# Patient Record
Sex: MALE | Race: WHITE | NOT HISPANIC OR LATINO | URBAN - METROPOLITAN AREA
[De-identification: names, ages, dates, MRNs, and addresses within clinical notes are randomized per-mention and may not be internally consistent; named-entity substitution may affect disease eponyms.]

---

## 2017-12-11 ENCOUNTER — ON CAMPUS - OUTPATIENT (AMBULATORY)
Dept: URBAN - METROPOLITAN AREA HOSPITAL 2 | Facility: HOSPITAL | Age: 62
End: 2017-12-11
Payer: COMMERCIAL

## 2017-12-11 ENCOUNTER — OFFICE (AMBULATORY)
Dept: URBAN - METROPOLITAN AREA CLINIC 64 | Facility: CLINIC | Age: 62
End: 2017-12-11

## 2017-12-11 VITALS
TEMPERATURE: 97.3 F | OXYGEN SATURATION: 96 % | OXYGEN SATURATION: 99 % | SYSTOLIC BLOOD PRESSURE: 121 MMHG | SYSTOLIC BLOOD PRESSURE: 128 MMHG | RESPIRATION RATE: 16 BRPM | RESPIRATION RATE: 18 BRPM | SYSTOLIC BLOOD PRESSURE: 140 MMHG | DIASTOLIC BLOOD PRESSURE: 54 MMHG | HEART RATE: 84 BPM | HEART RATE: 93 BPM | HEART RATE: 87 BPM | SYSTOLIC BLOOD PRESSURE: 153 MMHG | DIASTOLIC BLOOD PRESSURE: 83 MMHG | HEART RATE: 80 BPM | HEIGHT: 74 IN | WEIGHT: 202 LBS | DIASTOLIC BLOOD PRESSURE: 96 MMHG | DIASTOLIC BLOOD PRESSURE: 84 MMHG | DIASTOLIC BLOOD PRESSURE: 98 MMHG | SYSTOLIC BLOOD PRESSURE: 110 MMHG | HEART RATE: 79 BPM | SYSTOLIC BLOOD PRESSURE: 148 MMHG | OXYGEN SATURATION: 97 %

## 2017-12-11 DIAGNOSIS — K64.1 SECOND DEGREE HEMORRHOIDS: ICD-10-CM

## 2017-12-11 DIAGNOSIS — K57.30 DIVERTICULOSIS OF LARGE INTESTINE WITHOUT PERFORATION OR ABS: ICD-10-CM

## 2017-12-11 DIAGNOSIS — D12.0 BENIGN NEOPLASM OF CECUM: ICD-10-CM

## 2017-12-11 DIAGNOSIS — Z12.11 ENCOUNTER FOR SCREENING FOR MALIGNANT NEOPLASM OF COLON: ICD-10-CM

## 2017-12-11 LAB
GI HISTOLOGY: A. UNSPECIFIED: (no result)
GI HISTOLOGY: PDF REPORT: (no result)

## 2017-12-11 PROCEDURE — 88305 TISSUE EXAM BY PATHOLOGIST: CPT | Performed by: INTERNAL MEDICINE

## 2017-12-11 PROCEDURE — 45380 COLONOSCOPY AND BIOPSY: CPT | Mod: 33 | Performed by: INTERNAL MEDICINE

## 2017-12-11 RX ADMIN — PROPOFOL: 10 INJECTION, EMULSION INTRAVENOUS at 11:31

## 2020-07-02 RX ORDER — OMEPRAZOLE 20 MG/1
CAPSULE, DELAYED RELEASE ORAL
Qty: 30 CAPSULE | Refills: 3 | Status: SHIPPED | OUTPATIENT
Start: 2020-07-02 | End: 2021-03-16

## 2020-07-02 RX ORDER — AMLODIPINE BESYLATE 5 MG/1
TABLET ORAL
Qty: 90 TABLET | Refills: 1 | Status: SHIPPED | OUTPATIENT
Start: 2020-07-02 | End: 2021-01-10

## 2020-07-10 RX ORDER — ATORVASTATIN CALCIUM 10 MG/1
TABLET, FILM COATED ORAL
Qty: 90 TABLET | Refills: 1 | Status: SHIPPED | OUTPATIENT
Start: 2020-07-10 | End: 2021-01-26

## 2021-01-10 RX ORDER — AMLODIPINE BESYLATE 5 MG/1
TABLET ORAL
Qty: 90 TABLET | Refills: 1 | Status: SHIPPED | OUTPATIENT
Start: 2021-01-10 | End: 2021-03-08 | Stop reason: SDUPTHER

## 2021-01-26 RX ORDER — ATORVASTATIN CALCIUM 10 MG/1
TABLET, FILM COATED ORAL
Qty: 30 TABLET | Refills: 0 | Status: SHIPPED | OUTPATIENT
Start: 2021-01-26 | End: 2021-03-08 | Stop reason: SDUPTHER

## 2021-03-08 NOTE — TELEPHONE ENCOUNTER
"    Caller: Lauren Yeung    Relationship: Self    Best call back number: [phone]    Medication needed:   Requested Prescriptions     Pending Prescriptions Disp Refills   • atorvastatin (LIPITOR) 10 MG tablet 30 tablet 0     Sig: Take 1 tablet by mouth Daily.   • amLODIPine (NORVASC) 5 MG tablet 90 tablet 1     Sig: Take 1 tablet by mouth Daily.       When do you need the refill by: TODAY     What details did the patient provide when requesting the medication: PT HAS BEEN OUT OF HIS MEDICATION FOR ALMOST A WEEK AND HAD HIS BLOOD PRESSURE TAKEN TODAY @ 170/110    PT STATED HIS HEAD \"FELT FUNNY\" BUT NO OTHER SYMPTOMS. FIRST PHYSICAL IS 5/14, CAN WE FILL AN EMERGENCY SCRIPT UNTIL HIS APPT    Does the patient have less than a 3 day supply:  [x] Yes  [] No    What is the patient's preferred pharmacy: [facility], [place] [address] - [phone] [facility] [phone]           "

## 2021-03-09 ENCOUNTER — TELEPHONE (OUTPATIENT)
Dept: FAMILY MEDICINE CLINIC | Facility: CLINIC | Age: 66
End: 2021-03-09

## 2021-03-09 RX ORDER — ATORVASTATIN CALCIUM 10 MG/1
10 TABLET, FILM COATED ORAL DAILY
Qty: 90 TABLET | Refills: 0 | Status: SHIPPED | OUTPATIENT
Start: 2021-03-09 | End: 2021-03-16 | Stop reason: SDUPTHER

## 2021-03-09 RX ORDER — AMLODIPINE BESYLATE 5 MG/1
5 TABLET ORAL DAILY
Qty: 90 TABLET | Refills: 0 | Status: SHIPPED | OUTPATIENT
Start: 2021-03-09 | End: 2021-03-16 | Stop reason: SDUPTHER

## 2021-03-09 NOTE — TELEPHONE ENCOUNTER
Please tell pt that I refill his meds- but no additional refills until he comes in as past due for appt and labs. The 90 days should last until his may appt

## 2021-03-16 ENCOUNTER — OFFICE VISIT (OUTPATIENT)
Dept: FAMILY MEDICINE CLINIC | Facility: CLINIC | Age: 66
End: 2021-03-16

## 2021-03-16 VITALS
RESPIRATION RATE: 12 BRPM | TEMPERATURE: 97.1 F | SYSTOLIC BLOOD PRESSURE: 136 MMHG | BODY MASS INDEX: 28.72 KG/M2 | HEART RATE: 103 BPM | HEIGHT: 74 IN | OXYGEN SATURATION: 99 % | DIASTOLIC BLOOD PRESSURE: 85 MMHG | WEIGHT: 223.8 LBS

## 2021-03-16 DIAGNOSIS — E78.41 ELEVATED LIPOPROTEIN(A): ICD-10-CM

## 2021-03-16 DIAGNOSIS — I10 ESSENTIAL HYPERTENSION: Primary | ICD-10-CM

## 2021-03-16 PROBLEM — E78.5 HYPERLIPIDEMIA: Status: ACTIVE | Noted: 2018-12-13

## 2021-03-16 PROBLEM — L57.0 ACTINIC KERATOSIS: Status: ACTIVE | Noted: 2017-10-11

## 2021-03-16 PROBLEM — K21.9 GASTROESOPHAGEAL REFLUX DISEASE: Status: ACTIVE | Noted: 2018-12-13

## 2021-03-16 PROBLEM — N40.1 ENLARGED PROSTATE WITH LOWER URINARY TRACT SYMPTOMS (LUTS): Status: ACTIVE | Noted: 2018-12-13

## 2021-03-16 PROCEDURE — 99213 OFFICE O/P EST LOW 20 MIN: CPT | Performed by: INTERNAL MEDICINE

## 2021-03-16 RX ORDER — ATORVASTATIN CALCIUM 10 MG/1
10 TABLET, FILM COATED ORAL DAILY
Qty: 90 TABLET | Refills: 3 | Status: SHIPPED | OUTPATIENT
Start: 2021-03-16 | End: 2022-04-11

## 2021-03-16 RX ORDER — AMLODIPINE BESYLATE 5 MG/1
5 TABLET ORAL DAILY
Qty: 90 TABLET | Refills: 3 | Status: SHIPPED | OUTPATIENT
Start: 2021-03-16 | End: 2022-05-16

## 2021-03-16 NOTE — PROGRESS NOTES
"Rooming Tab(CC,VS,Pt Hx,Fall Screen)  Chief Complaint   Patient presents with   • Hypertension   • Hyperlipidemia       Subjective   Pt here for  HTN and hyperlipidemia.  Received physical at work and was ok- had all labs including EKG-  PSA was good.  Will bring in labs at next time   overall BP doing well- was high that one  Day that didn't take.  No chest pain. No difficulty  breahting   no swelling in ankles. No constipation     some unsteadiness- thinks form bifocals.    some BPH no longer saw palmetto    has GERD-  I have reviewed and updated his medications, medical history and problem list during today's office visit.     Patient Care Team:  Sravani Corrales MD as PCP - General    Problem List Tab  Medications Tab  Synopsis Tab  Chart Review Tab  Care Everywhere Tab  Immunizations Tab  Patient History Tab    Social History     Tobacco Use   • Smoking status: Never Smoker   • Smokeless tobacco: Never Used   Substance Use Topics   • Alcohol use: Never       Review of Systems    Objective     Rooming Tab(CC,VS,Pt Hx,Fall Screen)  /85   Pulse 103   Temp 97.1 °F (36.2 °C)   Resp 12   Ht 188 cm (74\")   Wt 102 kg (223 lb 12.8 oz)   SpO2 99%   BMI 28.73 kg/m²     Body mass index is 28.73 kg/m².    Physical Exam  Vitals and nursing note reviewed.   Constitutional:       Appearance: Normal appearance. He is well-developed.   HENT:      Head: Normocephalic and atraumatic.      Right Ear: Tympanic membrane normal.      Left Ear: Tympanic membrane normal.      Nose: No rhinorrhea.      Mouth/Throat:      Pharynx: No posterior oropharyngeal erythema.   Eyes:      Pupils: Pupils are equal, round, and reactive to light.   Cardiovascular:      Rate and Rhythm: Normal rate and regular rhythm.      Pulses: Normal pulses.      Heart sounds: Normal heart sounds. No murmur heard.     Pulmonary:      Effort: Pulmonary effort is normal.      Breath sounds: Normal breath sounds.   Abdominal:      General: Bowel " sounds are normal. There is no distension.      Palpations: Abdomen is soft.   Musculoskeletal:         General: No tenderness.      Cervical back: Normal range of motion and neck supple.   Skin:     Capillary Refill: Capillary refill takes less than 2 seconds.   Neurological:      Mental Status: He is alert and oriented to person, place, and time.   Psychiatric:         Mood and Affect: Mood normal.         Behavior: Behavior normal.          Statin Choice Calculator  Data Reviewed:         The data below has been reviewed by Sravani Corrales MD on 03/16/2021.          Assessment/Plan   Order Review Tab  Health Maintenance Tab  Patient Plan/Order Tab  Diagnoses and all orders for this visit:    1. Essential hypertension (Primary)  Comments:  refill meds    2. Elevated lipoprotein(a)  Comments:  refill meds- reviewed labs    Other orders  -     amLODIPine (NORVASC) 5 MG tablet; Take 1 tablet by mouth Daily.  Dispense: 90 tablet; Refill: 3  -     atorvastatin (LIPITOR) 10 MG tablet; Take 1 tablet by mouth Daily.  Dispense: 90 tablet; Refill: 3        Wrapup Tab  Return in about 1 year (around 3/16/2022), or if symptoms worsen or fail to improve.       They were informed of the diagnosis and treatment plan and directed to f/u for any further problems or concerns.

## 2021-06-24 ENCOUNTER — OFFICE VISIT (OUTPATIENT)
Dept: FAMILY MEDICINE CLINIC | Facility: CLINIC | Age: 66
End: 2021-06-24

## 2021-06-24 VITALS
HEART RATE: 92 BPM | SYSTOLIC BLOOD PRESSURE: 126 MMHG | RESPIRATION RATE: 16 BRPM | BODY MASS INDEX: 29.24 KG/M2 | OXYGEN SATURATION: 97 % | TEMPERATURE: 97.8 F | HEIGHT: 74 IN | WEIGHT: 227.8 LBS | DIASTOLIC BLOOD PRESSURE: 80 MMHG

## 2021-06-24 DIAGNOSIS — S61.219A CUT OF FINGER: Primary | ICD-10-CM

## 2021-06-24 PROCEDURE — 99212 OFFICE O/P EST SF 10 MIN: CPT | Performed by: HOSPITALIST

## 2021-06-24 NOTE — PROGRESS NOTES
"Subjective   Lauren Yeung is a 66 y.o. male.     Subjective / HPI  Patient seen in follow up after he cut his middle finger, noted nicely healing, no sign of redness or swelling or discharge. Pt going for vacation tomorrow, advise follow up with primary care for annual physical.    Review of Systems    Objective     /80 (BP Location: Right arm, Patient Position: Sitting, Cuff Size: Adult)   Pulse 92   Temp 97.8 °F (36.6 °C) (Oral)   Resp 16   Ht 188 cm (74\")   Wt 103 kg (227 lb 12.8 oz)   SpO2 97%   BMI 29.25 kg/m²      Physical Exam  Finger without any sign of redness or swelling or discharge.  Procedures       Assessment/Plan   Diagnoses and all orders for this visit:    1. Cut of finger (Primary)  Comments:  noted healing nicely. pt advise to avoid water contact. follow up as needed.                "

## 2021-11-02 ENCOUNTER — OFFICE VISIT (OUTPATIENT)
Dept: FAMILY MEDICINE CLINIC | Facility: CLINIC | Age: 66
End: 2021-11-02

## 2021-11-02 ENCOUNTER — LAB (OUTPATIENT)
Dept: FAMILY MEDICINE CLINIC | Facility: CLINIC | Age: 66
End: 2021-11-02

## 2021-11-02 VITALS
BODY MASS INDEX: 29.24 KG/M2 | RESPIRATION RATE: 18 BRPM | OXYGEN SATURATION: 98 % | DIASTOLIC BLOOD PRESSURE: 72 MMHG | WEIGHT: 227.8 LBS | HEIGHT: 74 IN | SYSTOLIC BLOOD PRESSURE: 112 MMHG | TEMPERATURE: 97.8 F | HEART RATE: 94 BPM

## 2021-11-02 DIAGNOSIS — Z00.00 PREVENTATIVE HEALTH CARE: ICD-10-CM

## 2021-11-02 DIAGNOSIS — E78.41 ELEVATED LIPOPROTEIN(A): ICD-10-CM

## 2021-11-02 DIAGNOSIS — Z23 NEED FOR VACCINATION: Primary | ICD-10-CM

## 2021-11-02 DIAGNOSIS — N28.9 RENAL INSUFFICIENCY: ICD-10-CM

## 2021-11-02 DIAGNOSIS — N28.9 RENAL INSUFFICIENCY: Primary | ICD-10-CM

## 2021-11-02 DIAGNOSIS — I10 PRIMARY HYPERTENSION: ICD-10-CM

## 2021-11-02 DIAGNOSIS — K21.9 GASTROESOPHAGEAL REFLUX DISEASE WITHOUT ESOPHAGITIS: ICD-10-CM

## 2021-11-02 PROCEDURE — 90471 IMMUNIZATION ADMIN: CPT | Performed by: INTERNAL MEDICINE

## 2021-11-02 PROCEDURE — 90715 TDAP VACCINE 7 YRS/> IM: CPT | Performed by: INTERNAL MEDICINE

## 2021-11-02 PROCEDURE — 36415 COLL VENOUS BLD VENIPUNCTURE: CPT

## 2021-11-02 PROCEDURE — 80053 COMPREHEN METABOLIC PANEL: CPT | Performed by: INTERNAL MEDICINE

## 2021-11-02 PROCEDURE — 99397 PER PM REEVAL EST PAT 65+ YR: CPT | Performed by: INTERNAL MEDICINE

## 2021-11-02 RX ORDER — FAMOTIDINE 40 MG/1
40 TABLET, FILM COATED ORAL DAILY
Qty: 30 TABLET | Refills: 3 | Status: SHIPPED | OUTPATIENT
Start: 2021-11-02 | End: 2022-03-29

## 2021-11-02 NOTE — ASSESSMENT & PLAN NOTE
Renal condition is newly identified.  Continue current treatment regimen.  Renal condition will be reassessed in 3 months.   increase water consumption

## 2021-11-02 NOTE — PROGRESS NOTES
"Rooming Tab(CC,VS,Pt Hx,Fall Screen)  Chief Complaint   Patient presents with   • Annual Exam       Subjective   Pt here for annual exam- did blood work at work and found renal was up- unsure if was elevated  Before but doesn't think so.   reviewed all other labs- including PSA   had COVID vaccine  In feb/march but had COVID in 8/2021- had 2 bad days only- has taste and smell, no SOA headache resolved.  increased GERD taking tums everyday   no new moles,     no joint pain, mild back pain, no neuropathy.   hearing good  Using commercial insurance so not doing medicare wellness but phydical instead.    increased urination    I have reviewed and updated his medications, medical history and problem list during today's office visit.     Patient Care Team:  Sravani Corrales MD as PCP - General    Problem List Tab  Medications Tab  Synopsis Tab  Chart Review Tab  Care Everywhere Tab  Immunizations Tab  Patient History Tab    Social History     Tobacco Use   • Smoking status: Never Smoker   • Smokeless tobacco: Never Used   Substance Use Topics   • Alcohol use: Never       Review of Systems    Objective     Rooming Tab(CC,VS,Pt Hx,Fall Screen)  /72 (BP Location: Right arm, Patient Position: Sitting, Cuff Size: Adult)   Pulse 94   Temp 97.8 °F (36.6 °C) (Temporal)   Resp 18   Ht 188 cm (74\")   Wt 103 kg (227 lb 12.8 oz)   SpO2 98%   BMI 29.25 kg/m²     Body mass index is 29.25 kg/m².    Physical Exam  Vitals and nursing note reviewed.   Constitutional:       Appearance: Normal appearance. He is well-developed.   HENT:      Head: Normocephalic and atraumatic.      Right Ear: Tympanic membrane normal.      Left Ear: Tympanic membrane normal.      Nose: No rhinorrhea.      Mouth/Throat:      Pharynx: No posterior oropharyngeal erythema.   Eyes:      Pupils: Pupils are equal, round, and reactive to light.   Cardiovascular:      Rate and Rhythm: Normal rate and regular rhythm.      Pulses: Normal pulses.      " Heart sounds: Normal heart sounds. No murmur heard.      Pulmonary:      Effort: Pulmonary effort is normal.      Breath sounds: Normal breath sounds.   Abdominal:      General: Bowel sounds are normal. There is no distension.      Palpations: Abdomen is soft.   Musculoskeletal:         General: No tenderness.      Cervical back: Normal range of motion and neck supple.   Skin:     Capillary Refill: Capillary refill takes less than 2 seconds.   Neurological:      Mental Status: He is alert and oriented to person, place, and time.   Psychiatric:         Mood and Affect: Mood normal.         Behavior: Behavior normal.          Statin Choice Calculator  Data Reviewed:         The data below has been reviewed by Sravani Corrales MD on 11/02/2021.          Assessment/Plan   Order Review Tab  Health Maintenance Tab  Patient Plan/Order Tab  Diagnoses and all orders for this visit:    1. Need for vaccination (Primary)  -     Tdap Vaccine Greater Than or Equal To 8yo IM    2. Gastroesophageal reflux disease without esophagitis    3. Primary hypertension    4. Elevated lipoprotein(a)    5. Preventative health care  Assessment & Plan:  Discussed all recommendations       6. Renal insufficiency  Assessment & Plan:  Renal condition is newly identified.  Continue current treatment regimen.  Renal condition will be reassessed in 3 months.   increase water consumption      Other orders  -     famotidine (Pepcid) 40 MG tablet; Take 1 tablet by mouth Daily.  Dispense: 30 tablet; Refill: 3      Wrapup Tab  Return in about 3 months (around 2/2/2022), or if symptoms worsen or fail to improve, for Recheck.        During this visit for their annual exam, we reviewed their personal history, social history and family history.  We went over their medications and all the recommended health maintenence items for their age group. They were given the opportunity to ask questions and discuss other concerns.       will check renal today- if >  1.45 will check grisel Us advised ot drink more water

## 2021-11-03 LAB
ALBUMIN SERPL-MCNC: 4.2 G/DL (ref 3.5–5.2)
ALBUMIN/GLOB SERPL: 1.6 G/DL
ALP SERPL-CCNC: 67 U/L (ref 39–117)
ALT SERPL W P-5'-P-CCNC: 25 U/L (ref 1–41)
ANION GAP SERPL CALCULATED.3IONS-SCNC: 6.5 MMOL/L (ref 5–15)
AST SERPL-CCNC: 21 U/L (ref 1–40)
BILIRUB SERPL-MCNC: 0.4 MG/DL (ref 0–1.2)
BUN SERPL-MCNC: 24 MG/DL (ref 8–23)
BUN/CREAT SERPL: 18 (ref 7–25)
CALCIUM SPEC-SCNC: 9.2 MG/DL (ref 8.6–10.5)
CHLORIDE SERPL-SCNC: 106 MMOL/L (ref 98–107)
CO2 SERPL-SCNC: 27.5 MMOL/L (ref 22–29)
CREAT SERPL-MCNC: 1.33 MG/DL (ref 0.76–1.27)
GFR SERPL CREATININE-BSD FRML MDRD: 54 ML/MIN/1.73
GLOBULIN UR ELPH-MCNC: 2.7 GM/DL
GLUCOSE SERPL-MCNC: 120 MG/DL (ref 65–99)
POTASSIUM SERPL-SCNC: 4.1 MMOL/L (ref 3.5–5.2)
PROT SERPL-MCNC: 6.9 G/DL (ref 6–8.5)
SODIUM SERPL-SCNC: 140 MMOL/L (ref 136–145)

## 2022-03-29 RX ORDER — FAMOTIDINE 40 MG/1
TABLET, FILM COATED ORAL
Qty: 30 TABLET | Refills: 3 | Status: SHIPPED | OUTPATIENT
Start: 2022-03-29 | End: 2022-09-26

## 2022-04-11 RX ORDER — ATORVASTATIN CALCIUM 10 MG/1
TABLET, FILM COATED ORAL
Qty: 90 TABLET | Refills: 3 | Status: SHIPPED | OUTPATIENT
Start: 2022-04-11

## 2022-05-16 RX ORDER — AMLODIPINE BESYLATE 5 MG/1
TABLET ORAL
Qty: 90 TABLET | Refills: 1 | Status: SHIPPED | OUTPATIENT
Start: 2022-05-16 | End: 2022-11-28

## 2022-05-31 ENCOUNTER — TELEPHONE (OUTPATIENT)
Dept: FAMILY MEDICINE CLINIC | Facility: CLINIC | Age: 67
End: 2022-05-31

## 2022-05-31 NOTE — TELEPHONE ENCOUNTER
Caller: Lauren Yeung    Relationship to patient: Self    Best call back number: [phone]    Patient is needing: PATIENT CALLED AND SAID HE   STUCK HIMSELF WITH A NAIL YESTERDAY AND IS WANTING TO SEE WHEN HIS LAST TETANUS SHOT WAS WITH THE OFFICE

## 2022-09-26 RX ORDER — FAMOTIDINE 40 MG/1
TABLET, FILM COATED ORAL
Qty: 90 TABLET | Refills: 1 | Status: SHIPPED | OUTPATIENT
Start: 2022-09-26

## 2022-11-28 RX ORDER — AMLODIPINE BESYLATE 5 MG/1
TABLET ORAL
Qty: 90 TABLET | Refills: 1 | Status: SHIPPED | OUTPATIENT
Start: 2022-11-28

## 2023-03-24 ENCOUNTER — OFFICE VISIT (OUTPATIENT)
Dept: FAMILY MEDICINE CLINIC | Facility: CLINIC | Age: 68
End: 2023-03-24
Payer: COMMERCIAL

## 2023-03-24 VITALS
OXYGEN SATURATION: 98 % | DIASTOLIC BLOOD PRESSURE: 84 MMHG | SYSTOLIC BLOOD PRESSURE: 120 MMHG | HEART RATE: 80 BPM | BODY MASS INDEX: 27.85 KG/M2 | WEIGHT: 217 LBS | HEIGHT: 74 IN

## 2023-03-24 DIAGNOSIS — E86.0 DEHYDRATION: ICD-10-CM

## 2023-03-24 DIAGNOSIS — I10 PRIMARY HYPERTENSION: Primary | ICD-10-CM

## 2023-03-24 DIAGNOSIS — R40.4 TRANSIENT ALTERATION OF AWARENESS: ICD-10-CM

## 2023-03-24 PROCEDURE — 99214 OFFICE O/P EST MOD 30 MIN: CPT | Performed by: INTERNAL MEDICINE

## 2023-03-24 RX ORDER — ASPIRIN 81 MG/1
81 TABLET ORAL DAILY
COMMUNITY

## 2023-03-24 NOTE — PROGRESS NOTES
Rooming Tab(CC,VS,Pt Hx,Fall Screen)  Chief Complaint   Patient presents with   • Hospital Follow Up Visit     Seen at [facility] ED       Subjective      The patient presents today for a follow-up. He is accompanied by an adult female.    Slurred speech  The patient did not have enough to eat and drink the whole weekend. He tries not to eat anything after 9:00 PM because of the reflux. He did not drink enough water. He ate a bland diet. He started seeing the floaters and there are usually thread light floaters that is normal. They look like square spider webs.  He was standing and talking and all of a sudden, he could not understand what he was saying. He was driven to the medical department and by the time he got up there, it had stopped. He felt woozy, but all that stopped. He went in and took his blood glucose. He had a banana and orange. He did not have any issues swallowing. He did feel lightheaded, but he was fine. The nurse practitioner told him that he was going to the emergency room and his blood pressure was high at 157/90s mmHg.     By the time he got to the emergency room, it went down, but it was still 138/92 mmHg. He was not having slurred speech or dizziness anymore. He never had 200 mmHg systolic blood pressure. He laid on the gurney for 30 minutes before they finally got a room for him. He felt totally fine.    They did a chest radiograph, a CAT scan of his head, a lot of blood work, and an EKG. They did an MRI. He had to go to the bathroom and his urine was darker than it normally is. He thinks he was dehydrated and undernourished. He did not get the stomach bug. He does not remember if they wanted to do a stress test at some point. He had blood glucose of 62 mg/dL. He does not check his blood pressure at home. He has been taking his blood pressure medication every day. He does not feel like he has to hold onto the walls because he gets lightheaded. He does not see any floaters.      Sinus  "problems  The patient has had sinus problems for 60 years and they are bothering him right now. It is just draining all the time and it can be green or yellow. He uses a nasal rinse occasionally, but it does not improve his symptoms when he does it. He uses Flonase when allergy season starts. He has had allergy injections up until 2005. He was extremely allergic to Hickory and maple. He was given an EpiPen and Benadryl.      I have reviewed and updated his medications, medical history and problem list during today's office visit.     Patient Care Team:  Sravani Corrales MD as PCP - General    Problem List Tab  Medications Tab  Synopsis Tab  Chart Review Tab  Care Everywhere Tab  Immunizations Tab  Patient History Tab    Social History     Tobacco Use   • Smoking status: Never   • Smokeless tobacco: Never   Substance Use Topics   • Alcohol use: Never       Review of Systems    Objective     Rooming Tab(CC,VS,Pt Hx,Fall Screen)  /84   Pulse 80   Ht 188 cm (74\")   Wt 98.4 kg (217 lb)   SpO2 98%   BMI 27.86 kg/m²     Body mass index is 27.86 kg/m².    Physical Exam  Vitals and nursing note reviewed.   Constitutional:       Appearance: Normal appearance. He is well-developed.   HENT:      Head: Normocephalic and atraumatic.      Right Ear: Tympanic membrane normal.      Left Ear: Tympanic membrane normal.      Nose: No rhinorrhea.      Mouth/Throat:      Pharynx: No posterior oropharyngeal erythema.   Eyes:      Pupils: Pupils are equal, round, and reactive to light.   Cardiovascular:      Rate and Rhythm: Normal rate and regular rhythm.      Pulses: Normal pulses.      Heart sounds: Normal heart sounds. No murmur heard.  Pulmonary:      Effort: Pulmonary effort is normal.      Breath sounds: Normal breath sounds.   Musculoskeletal:         General: No tenderness.      Cervical back: Normal range of motion and neck supple.   Skin:     Capillary Refill: Capillary refill takes less than 2 seconds. "   Neurological:      Mental Status: He is alert and oriented to person, place, and time.   Psychiatric:         Mood and Affect: Mood normal.         Behavior: Behavior normal.          Statin Choice Calculator  Data Reviewed:         The data below has been reviewed by Sravani Corrales MD on 03/24/2023.          Assessment & Plan   Order Review Tab  Health Maintenance Tab  Patient Plan/Order Tab  Diagnoses and all orders for this visit:    1. Primary hypertension (Primary)    2. Transient alteration of awareness  Comments:  reviewed all labs and MRI/EKG. no medical reason for slurred speech except low sugar- advised better eating and no skipping meals     3. Dehydration         1. Hypertension  - I advised the patient to check his blood pressure once a week.  - I advised the patient to eat 3 small meals, but at least 3 meals that have protein in every meal.    2. Sinus infection  - I advised the patient to make an appointment with Dr. Sweeney.      Wrapup Tab  Return in about 3 months (around 6/24/2023), or if symptoms worsen or fail to improve, for Annual physical.       They were informed of the diagnosis and treatment plan and directed to f/u for any further problems or concerns.                Answers for HPI/ROS submitted by the patient on 3/22/2023  What is the primary reason for your visit?: Neurological Problem  altered mental status: Yes  clumsiness: No  focal sensory loss: No  focal weakness: No  loss of balance: No  memory loss: No  near-syncope: No  slurred speech: Yes  visual change: Yes  Onset: in the past 7 days  Onset quality: suddenly  Progression since onset: resolved  Focality: no focality noted  auditory change: No  aura: No  bowel incontinence: No  headaches: No  vertigo: No  Treatments tried: aspirin, bed rest, drinking, eating  Improvement on treatment: significant      Transcribed from ambient dictation for Sravani Corrales MD by Janis Hernandez.  03/24/23   18:01 EDT    Patient or  patient representative verbalized consent to the visit recording.  I have personally performed the services described in this document as transcribed by the above individual, and it is both accurate and complete.  Sravani Corrales MD  3/29/2023  21:09 EDT

## 2023-03-29 PROBLEM — R40.4 TRANSIENT ALTERATION OF AWARENESS: Status: ACTIVE | Noted: 2023-03-29

## 2023-03-29 PROBLEM — E86.0 DEHYDRATION: Status: ACTIVE | Noted: 2023-03-29

## 2023-04-23 RX ORDER — ATORVASTATIN CALCIUM 10 MG/1
TABLET, FILM COATED ORAL
Qty: 90 TABLET | Refills: 3 | Status: SHIPPED | OUTPATIENT
Start: 2023-04-23

## 2023-05-30 RX ORDER — AMLODIPINE BESYLATE 5 MG/1
TABLET ORAL
Qty: 90 TABLET | Refills: 1 | Status: SHIPPED | OUTPATIENT
Start: 2023-05-30

## 2023-12-14 ENCOUNTER — OFFICE VISIT (OUTPATIENT)
Dept: FAMILY MEDICINE CLINIC | Facility: CLINIC | Age: 68
End: 2023-12-14
Payer: COMMERCIAL

## 2023-12-14 VITALS
BODY MASS INDEX: 28.77 KG/M2 | OXYGEN SATURATION: 99 % | HEART RATE: 99 BPM | HEIGHT: 74 IN | RESPIRATION RATE: 16 BRPM | WEIGHT: 224.2 LBS | DIASTOLIC BLOOD PRESSURE: 86 MMHG | SYSTOLIC BLOOD PRESSURE: 140 MMHG

## 2023-12-14 DIAGNOSIS — E78.41 ELEVATED LIPOPROTEIN(A): ICD-10-CM

## 2023-12-14 DIAGNOSIS — R35.1 BENIGN PROSTATIC HYPERPLASIA WITH NOCTURIA: ICD-10-CM

## 2023-12-14 DIAGNOSIS — Z00.00 ENCOUNTER FOR PHYSICAL EXAMINATION: Primary | ICD-10-CM

## 2023-12-14 DIAGNOSIS — I10 PRIMARY HYPERTENSION: ICD-10-CM

## 2023-12-14 DIAGNOSIS — N40.1 BENIGN PROSTATIC HYPERPLASIA WITH NOCTURIA: ICD-10-CM

## 2023-12-14 RX ORDER — AMLODIPINE BESYLATE 5 MG/1
5 TABLET ORAL DAILY
Qty: 90 TABLET | Refills: 3 | Status: SHIPPED | OUTPATIENT
Start: 2023-12-14

## 2023-12-14 RX ORDER — TAMSULOSIN HYDROCHLORIDE 0.4 MG/1
1 CAPSULE ORAL DAILY
Qty: 90 CAPSULE | Refills: 3 | Status: SHIPPED | OUTPATIENT
Start: 2023-12-14

## 2023-12-14 NOTE — PROGRESS NOTES
"Chief Complaint  Establish Care (Med refills /Left arm elbow pain)    Subjective        Lauren Yeung presents to [facility]  History of Present Illness  Lauren is a 68-year-old male with history of GERD, hyperlipidemia presents today to establish care with me.  Overall he states he is feeling pretty well.  He works for [employer] company and is .  He has a 15-year-old daughter that keeps him pretty busy.  Reviewed his past medical history, social, family, surgical    Hypertension  Currently states he has never been on medication for this.  States it has always been somewhat high.  Denies any chest pain, vision changes, syncope, headaches.    GERD  Currently well-controlled with the Pepcid 40 mg daily.  States that that controls his symptoms pretty well.  He takes it more as needed as needed for it.  States that that controls his symptoms well.    He does state that he does wake up multiple times at night to urinate.  States that his stream is not as strong as it used to be.  Does have difficulties obtaining history.      Objective   Vital Signs:  /86 (BP Location: Left arm, Patient Position: Sitting, Cuff Size: Large Adult)   Pulse 99   Resp 16   Ht 188 cm (74\")   Wt 102 kg (224 lb 3.2 oz)   SpO2 99%   BMI 28.79 kg/m²   Estimated body mass index is 28.79 kg/m² as calculated from the following:    Height as of this encounter: 188 cm (74\").    Weight as of this encounter: 102 kg (224 lb 3.2 oz).       BMI is >= 25 and <30. (Overweight) The following options were offered after discussion;: exercise counseling/recommendations and nutrition counseling/recommendations      Physical Exam  Vitals and nursing note reviewed.   Constitutional:       General: He is not in acute distress.     Appearance: Normal appearance. He is not toxic-appearing.   HENT:      Head: Normocephalic and atraumatic.      Right Ear: Tympanic membrane, ear canal and " external ear normal.      Left Ear: Tympanic membrane, ear canal and external ear normal.      Nose: Nose normal. No congestion or rhinorrhea.      Mouth/Throat:      Mouth: Mucous membranes are moist.      Pharynx: No oropharyngeal exudate.   Eyes:      General: No scleral icterus.        Right eye: No discharge.         Left eye: No discharge.      Extraocular Movements: Extraocular movements intact.      Conjunctiva/sclera: Conjunctivae normal.      Pupils: Pupils are equal, round, and reactive to light.   Cardiovascular:      Rate and Rhythm: Normal rate and regular rhythm.      Pulses: Normal pulses.      Heart sounds: Normal heart sounds. No murmur heard.  Pulmonary:      Effort: Pulmonary effort is normal. No respiratory distress.      Breath sounds: Normal breath sounds. No wheezing.   Abdominal:      General: Abdomen is flat. Bowel sounds are normal. There is no distension.      Palpations: Abdomen is soft.      Tenderness: There is no abdominal tenderness. There is no guarding.   Musculoskeletal:         General: No swelling, tenderness or deformity. Normal range of motion.      Cervical back: Normal range of motion and neck supple. No rigidity or tenderness.   Lymphadenopathy:      Cervical: No cervical adenopathy.   Skin:     General: Skin is warm.      Capillary Refill: Capillary refill takes less than 2 seconds.   Neurological:      General: No focal deficit present.      Mental Status: He is alert and oriented to person, place, and time. Mental status is at baseline.      Cranial Nerves: No cranial nerve deficit.      Gait: Gait normal.   Psychiatric:         Mood and Affect: Mood normal.         Behavior: Behavior normal.         Thought Content: Thought content normal.         Judgment: Judgment normal.        Result Review :  The following data was reviewed by: Richard Sarmiento MD on 12/14/2023:    Data reviewed : Previous labs             Assessment and Plan   Diagnoses and all orders for this  visit:    1. Encounter for physical examination (Primary)    2. Elevated lipoprotein(a)    3. Benign prostatic hyperplasia with nocturia    4. Primary hypertension    Other orders  -     amLODIPine (NORVASC) 5 MG tablet; Take 1 tablet by mouth Daily.  Dispense: 90 tablet; Refill: 3  -     tamsulosin (FLOMAX) 0.4 MG capsule 24 hr capsule; Take 1 capsule by mouth Daily.  Dispense: 90 capsule; Refill: 3  -     Shingrix Vaccine    Hypertension  Currently not well-controlled.  However he has been out of his blood pressure medications now for over a week.  Will restart and he should get me those records of his blood pressure over the next couple weeks.    GERD  Continue Pepcid as needed for pain.  No further indication for workup.  Currently well-controlled.    BPH  Has some nocturia and weak stream.  Start tamsulosin and see if how that helps.  Will see how he does with that.  If better we can continue with no change we can stop.  This will also help with his blood pressure as well.    Hyperlipidemia  Currently well-controlled.  Continue atorvastatin 10 mg.  Will due for labs at his next visit.         Follow Up   Return in about 1 year (around 12/14/2024) for Annual physical.  Patient was given instructions and counseling regarding his condition or for health maintenance advice. Please see specific information pulled into the AVS if appropriate.

## 2023-12-18 PROBLEM — Z00.00 PREVENTATIVE HEALTH CARE: Status: RESOLVED | Noted: 2021-11-02 | Resolved: 2023-12-18

## 2023-12-18 PROBLEM — R40.4 TRANSIENT ALTERATION OF AWARENESS: Status: RESOLVED | Noted: 2023-03-29 | Resolved: 2023-12-18

## 2024-02-15 DIAGNOSIS — Z12.11 SCREENING FOR MALIGNANT NEOPLASM OF COLON: Primary | ICD-10-CM

## 2024-04-14 RX ORDER — ATORVASTATIN CALCIUM 10 MG/1
TABLET, FILM COATED ORAL
Qty: 90 TABLET | Refills: 0 | Status: SHIPPED | OUTPATIENT
Start: 2024-04-14

## 2024-04-22 ENCOUNTER — ON CAMPUS - OUTPATIENT (AMBULATORY)
Dept: URBAN - METROPOLITAN AREA HOSPITAL 2 | Facility: HOSPITAL | Age: 69
End: 2024-04-22
Payer: COMMERCIAL

## 2024-04-22 ENCOUNTER — OFFICE (AMBULATORY)
Dept: URBAN - METROPOLITAN AREA PATHOLOGY 19 | Facility: PATHOLOGY | Age: 69
End: 2024-04-22
Payer: COMMERCIAL

## 2024-04-22 VITALS
DIASTOLIC BLOOD PRESSURE: 69 MMHG | RESPIRATION RATE: 15 BRPM | OXYGEN SATURATION: 96 % | RESPIRATION RATE: 20 BRPM | HEIGHT: 74 IN | HEART RATE: 80 BPM | DIASTOLIC BLOOD PRESSURE: 83 MMHG | SYSTOLIC BLOOD PRESSURE: 118 MMHG | RESPIRATION RATE: 16 BRPM | DIASTOLIC BLOOD PRESSURE: 78 MMHG | SYSTOLIC BLOOD PRESSURE: 97 MMHG | OXYGEN SATURATION: 97 % | WEIGHT: 220 LBS | OXYGEN SATURATION: 98 % | SYSTOLIC BLOOD PRESSURE: 148 MMHG | OXYGEN SATURATION: 95 % | SYSTOLIC BLOOD PRESSURE: 102 MMHG | DIASTOLIC BLOOD PRESSURE: 67 MMHG | SYSTOLIC BLOOD PRESSURE: 93 MMHG | DIASTOLIC BLOOD PRESSURE: 71 MMHG | HEART RATE: 86 BPM | TEMPERATURE: 98.1 F | RESPIRATION RATE: 18 BRPM | HEART RATE: 81 BPM | HEART RATE: 82 BPM | OXYGEN SATURATION: 94 % | SYSTOLIC BLOOD PRESSURE: 107 MMHG | RESPIRATION RATE: 17 BRPM | SYSTOLIC BLOOD PRESSURE: 109 MMHG | HEART RATE: 83 BPM | HEART RATE: 77 BPM | DIASTOLIC BLOOD PRESSURE: 95 MMHG | SYSTOLIC BLOOD PRESSURE: 117 MMHG

## 2024-04-22 DIAGNOSIS — Z09 ENCOUNTER FOR FOLLOW-UP EXAMINATION AFTER COMPLETED TREATMEN: ICD-10-CM

## 2024-04-22 DIAGNOSIS — D12.4 BENIGN NEOPLASM OF DESCENDING COLON: ICD-10-CM

## 2024-04-22 DIAGNOSIS — Z86.010 PERSONAL HISTORY OF COLONIC POLYPS: ICD-10-CM

## 2024-04-22 DIAGNOSIS — K57.30 DIVERTICULOSIS OF LARGE INTESTINE WITHOUT PERFORATION OR ABS: ICD-10-CM

## 2024-04-22 DIAGNOSIS — K64.1 SECOND DEGREE HEMORRHOIDS: ICD-10-CM

## 2024-04-22 PROBLEM — K63.5 POLYP OF COLON: Status: ACTIVE | Noted: 2024-04-22

## 2024-04-22 LAB
GI HISTOLOGY: A. DESCENDING COLON: (no result)
GI HISTOLOGY: PDF REPORT: (no result)

## 2024-04-22 PROCEDURE — 45385 COLONOSCOPY W/LESION REMOVAL: CPT | Mod: 33 | Performed by: INTERNAL MEDICINE

## 2024-04-22 PROCEDURE — 88305 TISSUE EXAM BY PATHOLOGIST: CPT | Performed by: PATHOLOGY

## 2024-04-29 ENCOUNTER — TELEPHONE (OUTPATIENT)
Dept: FAMILY MEDICINE CLINIC | Facility: CLINIC | Age: 69
End: 2024-04-29
Payer: COMMERCIAL

## 2024-04-29 NOTE — TELEPHONE ENCOUNTER
Pt called back and results/message given to pt and he v/u. States that Dr. Hernandez's office already called him and told him that his pathology results were negative.     Tried calling pt to give result/message and had to LVM to call back.     ----- Message from Kacy KIMBALL sent at 4/29/2024  7:00 AM EDT -----  Please let the patient know that his colonoscopy showed a couple things. One, polyps removed and will wait for pathology to determine what the next best step for those.  Next, he had diverticulosis which is an outpouching of the intestines, would make sure that he eats a good balanced diet and increase his fiber intake.  Would also suggest taking a stool softener daily.  He also had some internal and external hemorrhoids.  He will need repeat colonoscopy in 3 years.  Thanks

## 2024-07-16 RX ORDER — ATORVASTATIN CALCIUM 10 MG/1
10 TABLET, FILM COATED ORAL DAILY
Qty: 90 TABLET | Refills: 0 | Status: SHIPPED | OUTPATIENT
Start: 2024-07-16

## 2024-10-11 RX ORDER — ATORVASTATIN CALCIUM 10 MG/1
10 TABLET, FILM COATED ORAL DAILY
Qty: 90 TABLET | Refills: 0 | Status: SHIPPED | OUTPATIENT
Start: 2024-10-11

## 2024-12-06 RX ORDER — TAMSULOSIN HYDROCHLORIDE 0.4 MG/1
1 CAPSULE ORAL DAILY
Qty: 90 CAPSULE | Refills: 3 | Status: SHIPPED | OUTPATIENT
Start: 2024-12-06

## 2024-12-06 RX ORDER — AMLODIPINE BESYLATE 5 MG/1
5 TABLET ORAL DAILY
Qty: 90 TABLET | Refills: 3 | Status: SHIPPED | OUTPATIENT
Start: 2024-12-06

## 2025-01-15 RX ORDER — ATORVASTATIN CALCIUM 10 MG/1
10 TABLET, FILM COATED ORAL DAILY
Qty: 90 TABLET | Refills: 0 | Status: SHIPPED | OUTPATIENT
Start: 2025-01-15

## 2025-02-18 ENCOUNTER — OFFICE VISIT (OUTPATIENT)
Dept: FAMILY MEDICINE CLINIC | Facility: CLINIC | Age: 70
End: 2025-02-18
Payer: COMMERCIAL

## 2025-02-18 VITALS
RESPIRATION RATE: 18 BRPM | OXYGEN SATURATION: 97 % | WEIGHT: 227.4 LBS | HEART RATE: 92 BPM | SYSTOLIC BLOOD PRESSURE: 142 MMHG | HEIGHT: 74 IN | DIASTOLIC BLOOD PRESSURE: 88 MMHG | BODY MASS INDEX: 29.18 KG/M2

## 2025-02-18 DIAGNOSIS — I10 PRIMARY HYPERTENSION: ICD-10-CM

## 2025-02-18 DIAGNOSIS — K21.9 GASTROESOPHAGEAL REFLUX DISEASE WITHOUT ESOPHAGITIS: ICD-10-CM

## 2025-02-18 DIAGNOSIS — E78.41 ELEVATED LIPOPROTEIN(A): ICD-10-CM

## 2025-02-18 DIAGNOSIS — R35.1 BENIGN PROSTATIC HYPERPLASIA WITH NOCTURIA: ICD-10-CM

## 2025-02-18 DIAGNOSIS — N40.1 BENIGN PROSTATIC HYPERPLASIA WITH NOCTURIA: ICD-10-CM

## 2025-02-18 DIAGNOSIS — Z00.00 PREVENTATIVE HEALTH CARE: Primary | ICD-10-CM

## 2025-02-18 PROBLEM — E86.0 DEHYDRATION: Status: RESOLVED | Noted: 2023-03-29 | Resolved: 2025-02-18

## 2025-02-18 PROBLEM — N28.9 RENAL INSUFFICIENCY: Status: RESOLVED | Noted: 2021-11-02 | Resolved: 2025-02-18

## 2025-02-18 PROCEDURE — 99214 OFFICE O/P EST MOD 30 MIN: CPT | Performed by: INTERNAL MEDICINE

## 2025-02-18 PROCEDURE — 99397 PER PM REEVAL EST PAT 65+ YR: CPT | Performed by: INTERNAL MEDICINE

## 2025-02-18 NOTE — PATIENT INSTRUCTIONS
Up to date with labs    Up to date vaccine    Medications:  Continue current medications as prescribed    Encourage healthy diet and exercise    Follow up in October for Medicare wellness visit

## 2025-02-18 NOTE — PROGRESS NOTES
Chief Complaint  Establish Care    HPI:    Lauren Yeung presents to [facility]    Patient is a 69-year-old male presenting to establish care with new PCP.  Previously followed with Dr. Sarmiento.  Most recent annual preventative care visit 12/14/2023.    Hypertension  Currently prescribed amlodipine 5 mg daily.  Recent home blood pressures typically better controlled than office reading. Denies side effects on current anti-hypertensive medication regimen. Denies headaches, blurry vision, dizziness, chest pain, shortness of breath, or palpitations.  Diet and exercise could be better.     Hyperlipidemia  Patient currently prescribed atorvastatin 10 mg daily.  Compliant with medication and tolerating well without significant side effects.  No side effects.     GERD  Previously on Pepsid. Symptoms well controlled with not eating lat at nightly.  Denies dysphagia, dyne aphasia, or unexplained weight loss.  No current alcohol and tobacco use.     Chronic sinus drainage  Currently following with allergy and getting shots. Reportedly allergic to trees and pollen. Currently on Flonase twice daily.     BPH  Currently taking tamsulosin 0.4 mg daily.  Currently using the bathroom approximately 2-3 times nightly. Denies dysuria, urgency, frequency, hematuria, cloudy/foul smelling urine, or flank pain.     Preventative:    Social:     Diet and Exercise: Could be better    Alcohol, Tobacco, and Recreational Drug use: None    Cancer screenings:  PSA: Most recently normal 10/18/2024  Colonoscopy: Most recent April 2024; repeat in 3 years     Immunizations: Up to date    Advanced Health Care Directive:      Review of Systems:  ROS negative unless otherwise noted in HPI above.    Past Medical History:   Diagnosis Date    Allergic 2003    Taking allergy shots    Cataract     Colon polyp     Last two colonoscopy had one removed    Diverticulosis     GERD (gastroesophageal reflux disease)      "Controlling by not eating after 9:00    Hyperlipidemia     Hypertension     Scoliosis     Minor         Current Outpatient Medications:     amLODIPine (NORVASC) 5 MG tablet, TAKE 1 TABLET BY MOUTH DAILY, Disp: 90 tablet, Rfl: 3    atorvastatin (LIPITOR) 10 MG tablet, Take 1 tablet by mouth Daily., Disp: 90 tablet, Rfl: 0    fluticasone (FLONASE) 50 MCG/ACT nasal spray, 2 sprays into the nostril(s) as directed by provider Daily., Disp: 9.9 mL, Rfl: 0    tamsulosin (FLOMAX) 0.4 MG capsule 24 hr capsule, TAKE 1 CAPSULE BY MOUTH DAILY, Disp: 90 capsule, Rfl: 3    Social History     Socioeconomic History    Marital status:    Tobacco Use    Smoking status: Never    Smokeless tobacco: Never   Vaping Use    Vaping status: Never Used   Substance and Sexual Activity    Alcohol use: Never    Drug use: Never    Sexual activity: Yes     Partners: Female     Birth control/protection: Vasectomy        Objective   Vital Signs:  /88   Pulse 92   Resp 18   Ht 188 cm (74\")   Wt 103 kg (227 lb 6.4 oz)   SpO2 97%   BMI 29.20 kg/m²   Estimated body mass index is 29.2 kg/m² as calculated from the following:    Height as of this encounter: 188 cm (74\").    Weight as of this encounter: 103 kg (227 lb 6.4 oz).    Physical Exam:  General: Well-appearing patient, no apparent distress  HEENT: No posterior pharynx erythema, no tonsillar erythema or exudates, normal external auditory canals, TM normal without bulging or erythema  Cardiac: Regular rate and rhythm, normal S1/S2, no murmur, rubs or gallops, no lower extremity edema  Lungs: Clear to auscultation bilaterally, no crackles or wheezes  Abdomen: Soft, non-tender, no guarding or rebound tenderness, no hepatosplenomegaly  Skin: No significant rashes or lesions  MSK: Grossly normal tone and strength  Neuro: Alert and oriented x3, CN II-XII grossly intact  Psych: Appropriate mood and affect    Assessment and Plan:    (Z00.00) Preventative health care  Patient is a 69 year " minnie olivo who is overall doing well. Reviewed social and family history. Encouraged increased healthy diet and exercise and discussed importance to overall health. Up to date with cancer screenings including PSA and colonoscopy. Discussed indicated vaccines based on age and comorbidities. No skin, mood concerns.  Plan:  - Encourage healthy diet and exercise  - Up date vaccines, if necessary  - Encouraged future advanced health care directive     (I10) Primary hypertension  Assessment: Blood pressure slightly elevated on clinic readings.  Obtaining additional home blood pressure readings before considering adjustments to antihypertensive medications.  Discussed the importance of healthy diet and exercise.  Plan:  - Creatinine, potassium  - Continue current medications as prescribed  - Intermittently monitor home blood pressures and follow up if elevated  - Encourage healthy diet and exercise    (E78.41) Elevated lipoprotein(a)  Assessment: Stable on statin without side effects. Discussed importance of healthy diet and exercise.  Plan:  - Fasting lipid panel up to date  - Continue statin without changes  - Discussed healthy diet and lifestyle     (N40.1,  R35.1) Benign prostatic hyperplasia with nocturia  Assessment: Symptoms overall stable on tamsulosin 0.4 mg daily.  Plan:  - Continue tamsulosin as prescribed    (K21.9) Gastroesophageal reflux disease without esophagitis   Assessment: Symptoms generally well-controlled with avoiding late night eating and Tums as needed.  No new or red flag symptoms to warrant endoscopy.  Plan:  - Avoid potential triggers  - Tums as needed    Patient was given instructions and counseling regarding his condition or for health maintenance advice. Please see specific information pulled into the AVS if appropriate.       Dr Mundo Zarate   Internal Medicine Physician  [facility]  [address], [address]  [place] [address]

## 2025-04-14 RX ORDER — ATORVASTATIN CALCIUM 10 MG/1
10 TABLET, FILM COATED ORAL DAILY
Qty: 90 TABLET | Refills: 0 | Status: SHIPPED | OUTPATIENT
Start: 2025-04-14

## 2025-04-24 ENCOUNTER — OFFICE VISIT (OUTPATIENT)
Dept: FAMILY MEDICINE CLINIC | Facility: CLINIC | Age: 70
End: 2025-04-24
Payer: COMMERCIAL

## 2025-04-24 VITALS
HEART RATE: 104 BPM | DIASTOLIC BLOOD PRESSURE: 68 MMHG | RESPIRATION RATE: 18 BRPM | BODY MASS INDEX: 28.98 KG/M2 | OXYGEN SATURATION: 97 % | SYSTOLIC BLOOD PRESSURE: 124 MMHG | WEIGHT: 225.8 LBS | HEIGHT: 74 IN

## 2025-04-24 DIAGNOSIS — M25.512 ACUTE PAIN OF LEFT SHOULDER: ICD-10-CM

## 2025-04-24 DIAGNOSIS — M54.2 NECK PAIN: Primary | ICD-10-CM

## 2025-04-24 PROCEDURE — 99213 OFFICE O/P EST LOW 20 MIN: CPT | Performed by: INTERNAL MEDICINE

## 2025-04-24 RX ORDER — AZELASTINE HYDROCHLORIDE, FLUTICASONE PROPIONATE 137; 50 UG/1; UG/1
SPRAY, METERED NASAL
COMMUNITY

## 2025-04-24 NOTE — PROGRESS NOTES
Chief Complaint  Neck Pain and Shoulder Pain    HPI:    Lauren Yeung presents to [facility]    Patient is a 70-year-old male with a history of hypertension, hyperlipidemia, GERD, BPH presenting for evaluation of neck pain.    Patient reports that about a year ago he had a slip on ice and landed on left shoulder and neck. He had some pain that overall improved until earlier this year when he had another slip. He did not have a fall but did have a lot of movement to keep from falling. Pain described as an intermittent, non-radiating, dull or sharp pain present in the neck/shoulder. Pain worse with laying/trying to sleep and improves with rest and certain positions. Patient has been trying OTC medications, activity modifications. Denies heat/ice, massage, stretching, PT. Denies fever, chills, nausea, vomiting. Denies numbness, tingling, weakness, focal sensory/motor deficit. Denies recent trauma, bending/twisting, injury, or overuse. Denies history of ongoing cancer or immunocompromised state. Denies prior or recent imaging. Denies prior physical therapy, corticosteroid injections, surgeries, or procedure on the neck or left shoulder. Previously has had PT on right shoulder.     Most recent preventative care visit 2/18/2025.    Review of Systems:  ROS negative unless otherwise noted in HPI above.    Past Medical History:   Diagnosis Date    Allergic 2003    Taking allergy shots    Cataract     Colon polyp     Last two colonoscopy had one removed    Diverticulosis     Noted in last two colonoscopy bisits    GERD (gastroesophageal reflux disease)     Controlling by not eating after 9:00    Hyperlipidemia     Taking medication    Hypertension     Taking medication    Scoliosis     Minor         Current Outpatient Medications:     amLODIPine (NORVASC) 5 MG tablet, TAKE 1 TABLET BY MOUTH DAILY, Disp: 90 tablet, Rfl: 3    atorvastatin (LIPITOR) 10 MG tablet, TAKE 1 TABLET BY MOUTH  "DAILY, Disp: 90 tablet, Rfl: 0    Azelastine-Fluticasone 137-50 MCG/ACT suspension, , Disp: , Rfl:     fluticasone (FLONASE) 50 MCG/ACT nasal spray, 2 sprays into the nostril(s) as directed by provider Daily. (Patient not taking: Reported on 4/24/2025), Disp: 9.9 mL, Rfl: 0    tamsulosin (FLOMAX) 0.4 MG capsule 24 hr capsule, TAKE 1 CAPSULE BY MOUTH DAILY, Disp: 90 capsule, Rfl: 3    Social History     Socioeconomic History    Marital status:    Tobacco Use    Smoking status: Never    Smokeless tobacco: Never   Vaping Use    Vaping status: Never Used   Substance and Sexual Activity    Alcohol use: Never    Drug use: Never    Sexual activity: Yes     Partners: Female     Birth control/protection: Vasectomy        Objective   Vital Signs:  /68   Pulse 104   Resp 18   Ht 188 cm (74\")   Wt 102 kg (225 lb 12.8 oz)   SpO2 97%   BMI 28.99 kg/m²   Estimated body mass index is 28.99 kg/m² as calculated from the following:    Height as of this encounter: 188 cm (74\").    Weight as of this encounter: 102 kg (225 lb 12.8 oz).    Physical Exam:  General: Well-appearing patient, no apparent distress  Skin: No significant rashes or lesions  MSK: Grossly normal tone and strength, no point tenderness over cervical, thoracic, or lumbar spinous processes or paraspinal muscles, normal active and passive range of motion of the left shoulder, 5 out of 5 strength in upper extremities bilaterally  Neuro: Alert and oriented x3, CN II-XII grossly intact, normal sensation in upper and lower extremities bilaterally  Psych: Appropriate mood and affect    Assessment and Plan:    (M54.2) Neck pain  (M25.512) Acute pain of left shoulder -   Assessment: Patient with ongoing neck and left shoulder pain over the past year that initially resolved and then worsened over the last few months.  Currently asymptomatic in clinic without significant pain.  Concern for possible aggravation of degenerative changes or possible rotator cuff " injury given mechanism of fall.  No red flag symptoms.  Proceeding with imaging given prior fall and duration of symptoms.  Will treat conservatively for now and closely monitor. Discussed signs and symptoms which should prompt reevaluation and consideration of additional workup.   Plan:  - XR Spine Cervical 2 or 3 View, XR Shoulder 2+ View Left,   - Over-the-counter medications including acetaminophen, ibuprofen as needed  - Activity modification  - Heat/ice as needed  - Lidocaine patch as needed  - Consult physical therapy  - Consider MRI, referral to specialist if not improving       Patient was given instructions and counseling regarding his condition or for health maintenance advice. Please see specific information pulled into the AVS if appropriate.       Dr Mundo Zarate   Internal Medicine Physician  [facility]  [address], [address]  [place] [address]

## 2025-04-24 NOTE — PATIENT INSTRUCTIONS
Neck and left shoulder pain  Back pain  Plan:  - Imaging as ordered  - Over-the-counter medications including acetaminophen, ibuprofen as needed  - Activity modification  - Heat/ice as needed  - Lidocaine patch as needed  - Consult physical therapy  - Consider MRI, referral to specialist if not improving

## 2025-04-29 DIAGNOSIS — M25.512 ACUTE PAIN OF LEFT SHOULDER: ICD-10-CM

## 2025-05-06 ENCOUNTER — TREATMENT (OUTPATIENT)
Dept: PHYSICAL THERAPY | Facility: CLINIC | Age: 70
End: 2025-05-06
Payer: COMMERCIAL

## 2025-05-06 DIAGNOSIS — M25.512 ACUTE PAIN OF LEFT SHOULDER: Primary | ICD-10-CM

## 2025-05-06 PROCEDURE — 97112 NEUROMUSCULAR REEDUCATION: CPT | Performed by: PHYSICAL THERAPIST

## 2025-05-06 PROCEDURE — 97162 PT EVAL MOD COMPLEX 30 MIN: CPT | Performed by: PHYSICAL THERAPIST

## 2025-05-06 PROCEDURE — 97140 MANUAL THERAPY 1/> REGIONS: CPT | Performed by: PHYSICAL THERAPIST

## 2025-05-06 PROCEDURE — 97110 THERAPEUTIC EXERCISES: CPT | Performed by: PHYSICAL THERAPIST

## 2025-05-06 NOTE — PROGRESS NOTES
Physical Therapy Initial Evaluation and Plan of Care      Patient: Lauren Yeung   : 1955  Diagnosis/ICD-10 Code:  Acute pain of left shoulder [M25.512]  Referring practitioner: Mundo Zarate MD  Date of Initial Visit: 2025  Today's Date: 2025  Patient seen for 1 sessions           Subjective Questionnaire: QuickDASH: 14%      Subjective Evaluation    History of Present Illness  Mechanism of injury: Patient presents to physical therapy with cc of pain on left side of cervical spine and left shoulder.  Patient reports that he fell on ice in 2024 and landed on left shoulder.  However, reports that symptoms seem to resolve.  Reports that in January of this year he slipped on ice again and did not fall, however in the movement to avoid the fall he felt the pain in his neck again.  Patient had x-ray on  left shoulder and cervical spine and degenerative changes were noted in cervical spine.  Reports pain when laying down to sleep at night.  Also reports intermittent pain when sitting at desk at work.  Wishes to tolerate ADL's and work activities with less pain in cervical spine and left shoulder.      Pain  Current pain ratin  At worst pain ratin  Location: left cervical spine, superior left shoulder  Quality: sharp and knife-like  Relieving factors: change in position  Aggravating factors: sleeping and prolonged positioning  Progression: no change    Hand dominance: right    Diagnostic Tests  X-ray: abnormal    Patient Goals  Patient goals for therapy: decreased pain, increased strength and increased motion           Objective          Postural Observations  Seated posture: fair  Standing posture: fair      Tenderness     Additional Tenderness Details  TTP C5-C6 facet on left     Active Range of Motion   Cervical/Thoracic Spine   Cervical    Flexion: 42 degrees with pain  Extension: 26 degrees with pain  Left lateral flexion: 13 degrees with pain  Right lateral flexion: 22  degrees   Left rotation: 65 degrees   Right rotation: 56 degrees with pain    Strength/Myotome Testing   Cervical Spine     Right   Normal strength    Left Shoulder     Planes of Motion   Flexion: 4+   Abduction: 4+   External rotation at 0°: 4+   Internal rotation at 0°: 5     Left Elbow   Flexion: 5  Extension: 5    Left Wrist/Hand   Wrist extension: 5  Wrist flexion: 5    Tests   Cervical     Left   Positive active compression ([place]) and Spurling's sign.           Assessment & Plan       Assessment  Impairments: abnormal or restricted ROM, impaired physical strength, lacks appropriate home exercise program and pain with function   Functional limitations: carrying objects, lifting, sleeping, pulling, pushing, uncomfortable because of pain and sitting   Assessment details: Patient presents to physical therapy with s/s congruent with those of cervical radiculopathy (left).  Patient demonstrates limited AROM in cervical spine, weakness in LUE, and elevated pain level with movement of cervical spine.  Patient is appropriate for PT intervention in order to address these deficits so that he may tolerate ADL's and work activities with less pain/limitation.  Prognosis: good    Goals  Plan Goals: In two weeks, patient will report at least 25% reduction in pain level in cervical spine with sitting longer than 30 minutes in order to demonstrate improved tolerance with work activities.    In two weeks, patient will demonstrate at least 25% improvement in AROM in cervical spine.     In four weeks, patient will demonstrate at least 60 degrees of cervical rotation bilaterally in order to demonstrate no limitation when looking over shoulder when driving.   In four weeks, patient will demonstrate 5/5 muscular strength in LUE with MMT in order to demonstrate decreased limitation with lifting items such as laundry basket with household ADL's.  In four weeks, patient will demonstrate decreased perceived disability by decreasing  score on QuickDASH by at least 12%.      Plan  Therapy options: will be seen for skilled therapy services  Planned modality interventions: cryotherapy, TENS, thermotherapy (hydrocollator packs) and traction  Planned therapy interventions: manual therapy, neuromuscular re-education, postural training, soft tissue mobilization, spinal/joint mobilization, strengthening, stretching, therapeutic activities, joint mobilization, home exercise program and functional ROM exercises  Frequency: 2x week  Duration in weeks: 8  Treatment plan discussed with: patient        Manual Therapy:    10     mins  03031;  Therapeutic Exercise:    10     mins  50464;     Neuromuscular Jyoti:    10    mins  32507;    Therapeutic Activity:          mins  28779;     Gait Training:           mins  04617;     Ultrasound:          mins  33224;    Electrical Stimulation:         mins  00667 ( );  Dry Needling          mins self-pay    Timed Treatment:   30   mins   Total Treatment:     60   mins    PT SIGNATURE: Moncho Morejon PT   DATE TREATMENT INITIATED: 5/6/2025    Initial Certification  Certification Period: 8/4/2025  I certify that the therapy services are furnished while this patient is under my care.  The services outlined above are required by this patient, and will be reviewed every 90 days.     PHYSICIAN: Mundo Zarate MD      DATE:     Please sign and return via fax to [phone].. Thank you, [facility].

## 2025-05-08 ENCOUNTER — TREATMENT (OUTPATIENT)
Dept: PHYSICAL THERAPY | Facility: CLINIC | Age: 70
End: 2025-05-08
Payer: COMMERCIAL

## 2025-05-08 DIAGNOSIS — M25.512 ACUTE PAIN OF LEFT SHOULDER: Primary | ICD-10-CM

## 2025-05-08 NOTE — PROGRESS NOTES
Physical Therapy Daily Progress Note    Patient: Lauren Yeung   : 1955  Diagnosis/ICD-10 Code:  Acute pain of left shoulder [M25.512]  Referring practitioner: No ref. provider found  Date of Initial Visit: Type: THERAPY  Noted: 2025  Today's Date: 2025  Patient seen for 2 sessions         Lauren Yeung reports: Had less pain in cervical spine after last visit, however had to lift heavy items at work yesterday and feeling soreness today.      Objective   See Exercise, Manual, and Modality Logs for complete treatment.       Assessment/Plan    Good tolerance with manual therapy and exercise progression this visit.  Noted continued hypomobility with left sidebending/right rotation of cervical spine.  Improved cervical AROM noted after muscle energy.  Feeling better at end of visit.    Progress per Plan of Care           Manual Therapy:    15     mins  42702;  Therapeutic Exercise:    15     mins  72363;     Neuromuscular Jyoti:    8    mins  60382;    Therapeutic Activity:          mins  78960;     Gait Training:           mins  21303;     Ultrasound:          mins  17902;    Electrical Stimulation:         mins  98144 ( );  Dry Needling          mins self-pay    Timed Treatment:   38   mins   Total Treatment:     48   mins    Moncho Morejon PT  Physical Therapist

## 2025-05-13 ENCOUNTER — TREATMENT (OUTPATIENT)
Dept: PHYSICAL THERAPY | Facility: CLINIC | Age: 70
End: 2025-05-13
Payer: COMMERCIAL

## 2025-05-13 DIAGNOSIS — M25.512 ACUTE PAIN OF LEFT SHOULDER: Primary | ICD-10-CM

## 2025-05-13 DIAGNOSIS — M54.2 NECK PAIN: ICD-10-CM

## 2025-05-13 NOTE — PROGRESS NOTES
Physical Therapy Daily Progress Note    Patient: Lauren Yeung   : 1955  Diagnosis/ICD-10 Code:  Acute pain of left shoulder [M25.512]  Referring practitioner: Mundo Zarate MD  Date of Initial Visit: Type: THERAPY  Noted: 2025  Today's Date: 2025  Patient seen for 3 sessions         Lauren Yeung reports:  Feeling better this week.  Patient reports mild pain in cervical spine with work activities last week.  Compliant with HEP.     Objective   See Exercise, Manual, and Modality Logs for complete treatment.       Assessment/Plan    Feeling well after manual therapy this visit.  Patient demonstrates proper technique with exercises.  Progressing well.     Progress per Plan of Care           Manual Therapy:    20     mins  37748;  Therapeutic Exercise:    8     mins  42845;     Neuromuscular Jyoti:    8    mins  59122;    Therapeutic Activity:          mins  71225;     Gait Training:           mins  11253;     Ultrasound:          mins  96627;    Electrical Stimulation:         mins  94626 (MC );  Dry Needling          mins self-pay    Timed Treatment:   36   mins   Total Treatment:     46   mins    Moncho Morejon PT  Physical Therapist

## 2025-05-15 ENCOUNTER — TREATMENT (OUTPATIENT)
Dept: PHYSICAL THERAPY | Facility: CLINIC | Age: 70
End: 2025-05-15
Payer: COMMERCIAL

## 2025-05-15 DIAGNOSIS — M54.2 NECK PAIN: ICD-10-CM

## 2025-05-15 DIAGNOSIS — M25.512 ACUTE PAIN OF LEFT SHOULDER: Primary | ICD-10-CM

## 2025-05-15 NOTE — PROGRESS NOTES
Physical Therapy Daily Progress Note    Patient: Lauren Yeung   : 1955  Diagnosis/ICD-10 Code:  Acute pain of left shoulder [M25.512]  Referring practitioner: Mundo Zarate MD  Date of Initial Visit: Type: THERAPY  Noted: 2025  Today's Date: 5/15/2025  Patient seen for 4 sessions         Lauren Yeung reports: Reports increased pain on left side of cervical spine, however demonstrates completing upper trap stretch towards left side instead of towards right side.      Objective   See Exercise, Manual, and Modality Logs for complete treatment.       Assessment/Plan    Feeling better after muscle energy technique for left sidebending/right rotation this visit.  Good tolerance with UT stretch towards right side today.  Feeling better at end of visit.    Progress per Plan of Care           Manual Therapy:    20     mins  20537;  Therapeutic Exercise:    8     mins  64616;     Neuromuscular Jyoti:    8    mins  38105;    Therapeutic Activity:          mins  23690;     Gait Training:           mins  24053;     Ultrasound:          mins  39682;    Electrical Stimulation:         mins  66487 ( );  Dry Needling          mins self-pay    Timed Treatment:   36   mins   Total Treatment:     46   mins    Moncho Morejon PT  Physical Therapist

## 2025-05-20 ENCOUNTER — TREATMENT (OUTPATIENT)
Dept: PHYSICAL THERAPY | Facility: CLINIC | Age: 70
End: 2025-05-20
Payer: COMMERCIAL

## 2025-05-20 DIAGNOSIS — M25.512 ACUTE PAIN OF LEFT SHOULDER: Primary | ICD-10-CM

## 2025-05-20 DIAGNOSIS — M54.2 NECK PAIN: ICD-10-CM

## 2025-05-20 NOTE — PROGRESS NOTES
Physical Therapy Daily Progress Note      Patient: Lauren Yeung   : 1955  Diagnosis/ICD-10 Code:  Acute pain of left shoulder [M25.512]  Referring practitioner: Mundo Zarate MD  Date of Initial Visit: Type: THERAPY  Noted: 2025  Today's Date: 2025  Patient seen for 5 sessions         Lauren Yeung reports: ***  Subjective Questionnaire: {PT subjective questionnaires:89330}      Subjective     Objective   See Exercise, Manual, and Modality Logs for complete treatment.       Assessment/Plan    {AMB PT PLAN (SOAP Note):92964}           Manual Therapy:    ***     mins  27441;  Therapeutic Exercise:    ***     mins  73329;     Neuromuscular Jyoti:    ***    mins  02854;    Therapeutic Activity:     ***     mins  27430;     Gait Training:      ***     mins  42447;     Ultrasound:     ***     mins  33276;    Electrical Stimulation:    ***     mins  34132 ( );  Dry Needling     ***     mins self-pay    Timed Treatment:   ***   mins   Total Treatment:     ***   mins    Moncho Morejon PT  Physical Therapist

## 2025-05-20 NOTE — PROGRESS NOTES
Physical Therapy Daily Progress Note    Patient: Lauren Yeung   : 1955  Diagnosis/ICD-10 Code:  Acute pain of left shoulder [M25.512]  Referring practitioner: Mundo Zarate MD  Date of Initial Visit: Type: THERAPY  Noted: 2025  Today's Date: 2025  Patient seen for 5 sessions         Lauren Yeung reports:  Cervical spine feeling much better.  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  Reports compliance with HEP.    Objective   See Exercise, Manual, and Modality Logs for complete treatment.       Assessment/Plan    Feeling well after manual therapy.  Patient compliant with HEP.  Noted slight restriction with right sidebending/left rotation of cervical spine.  Feeling well at end of visit.    Progress per Plan of Care           Manual Therapy:    15     mins  43156;  Therapeutic Exercise:    5     mins  37785;     Neuromuscular Jyoti:    8    mins  83729;    Therapeutic Activity:          mins  42336;     Gait Training:           mins  14657;     Ultrasound:          mins  74483;    Electrical Stimulation:         mins  62556 ( );  Dry Needling          mins self-pay    Timed Treatment:   28   mins   Total Treatment:     38   mins    Moncho Morejon PT  Physical Therapist

## 2025-05-22 ENCOUNTER — TREATMENT (OUTPATIENT)
Dept: PHYSICAL THERAPY | Facility: CLINIC | Age: 70
End: 2025-05-22
Payer: COMMERCIAL

## 2025-05-22 DIAGNOSIS — M25.512 ACUTE PAIN OF LEFT SHOULDER: Primary | ICD-10-CM

## 2025-05-22 DIAGNOSIS — M54.2 NECK PAIN: ICD-10-CM

## 2025-05-22 NOTE — PROGRESS NOTES
Re-Assessment / Re-Certification      Patient: Lauren Yeung   : 1955  Diagnosis/ICD-10 Code:  Acute pain of left shoulder [M25.512]  Referring practitioner: Mundo Zarate MD  Date of Initial Visit: Type: THERAPY  Noted: 2025  Today's Date: 2025  Patient seen for 6 sessions      Subjective:   Lauren Yeung reports: Mild pain in cervical spine with movement (left sidebending, left rotation).  Patient reports much less difficulty sleeping due to pain in neck.  Reports also less pain with work activities.   Subjective Questionnaire: QuickDASH: 15%  Clinical Progress: improved  Home Program Compliance: Yes  Treatment has included: therapeutic exercise, neuromuscular re-education, manual therapy, and moist heat    Subjective Evaluation    Pain  Current pain ratin  At worst pain ratin       Objective          Strength/Myotome Testing     Additional Strength Details   (R): 70 lbs   (L): 63 lbs      Active Range of Motion   Cervical/Thoracic Spine   Cervical     Flexion: 42 degrees   Extension: 30 degrees   Left lateral flexion: 18 degrees with pain  Right lateral flexion: 25 degrees   Left rotation: 60 degrees with pain  Right rotation: 65 degrees      Strength/Myotome Testing   Cervical Spine      Right   Normal strength     Left Shoulder      Planes of Motion   Flexion: 5   Abduction: 5   External rotation at 0°: 4+   Internal rotation at 0°: 5      Left Elbow   Flexion: 5  Extension: 5     Left Wrist/Hand   Wrist extension: 5  Wrist flexion: 5    Assessment/Plan  Progress toward previous goals: Partially Met    Goals:   In two weeks, patient will report at least 25% reduction in pain level in cervical spine with sitting longer than 30 minutes in order to demonstrate improved tolerance with work activities.  met  In two weeks, patient will demonstrate at least 25% improvement in AROM in cervical spine. met     In four weeks, patient will demonstrate at least 60 degrees of  cervical rotation bilaterally in order to demonstrate no limitation when looking over shoulder when driving. met  In four weeks, patient will demonstrate 5/5 muscular strength in LUE with MMT in order to demonstrate decreased limitation with lifting items such as laundry basket with household ADL's. progressing  In four weeks, patient will demonstrate decreased perceived disability by decreasing score on QuickDASH by at least 12%. progressing    New Goals  Short-term goals (STG): In two weeks, patient will demonstrate at least 65 degrees of left cervical rotation in order to demonstrate ability to look over his shoulder at work with less limitation.  Long-term goals (LTG): In four weeks, patient will demonstrate within 5lbs congruence of  strength bilaterally in order to demonstrate less difficulty with picking up items with left hand.    Patient demonstrate improving cervical AROM, as well as reports decrease in pain level with activity.  Patient does demonstrate remaining AROM deficits in cervical spine.  Patient is appropriate to continue PT intervention in order to normalize cervical AROM and reduce pain level so that he may tolerate work and ADL activities with less pain.      Recommendations: Continue as planned  Timeframe: 1 month  Prognosis to achieve goals: good    PT Signature: Moncho Morejon PT      Based upon review of the patient's progress and continued therapy plan, it is my medical opinion that Lauren Yeung should continue physical therapy treatment at [facility]  [address] [place] [address] [phone].    Signature: __________________________________  Mundo Zarate MD    Manual Therapy:    25     mins  87345;  Therapeutic Exercise:    5     mins  01314;     Neuromuscular Jyoti:        mins  [id number];    Therapeutic Activity:          mins  94300;     Gait Training:           mins  79037;     Ultrasound:          mins   10424;    Electrical Stimulation:         mins  55128 ( );  Dry Needling          mins self-pay  PT RE-eval    10    Timed Treatment:   30   mins   Total Treatment:     40   mins

## 2025-05-27 ENCOUNTER — TREATMENT (OUTPATIENT)
Dept: PHYSICAL THERAPY | Facility: CLINIC | Age: 70
End: 2025-05-27
Payer: COMMERCIAL

## 2025-05-27 DIAGNOSIS — M25.512 ACUTE PAIN OF LEFT SHOULDER: Primary | ICD-10-CM

## 2025-05-27 DIAGNOSIS — M54.2 NECK PAIN: ICD-10-CM

## 2025-05-27 PROCEDURE — 97140 MANUAL THERAPY 1/> REGIONS: CPT | Performed by: PHYSICAL THERAPIST

## 2025-05-27 NOTE — PROGRESS NOTES
Physical Therapy Daily Progress Note      Patient: Lauren Yeung   : 1955  Diagnosis/ICD-10 Code:  Acute pain of left shoulder [M25.512]  Referring practitioner: No ref. provider found  Date of Initial Visit: Type: THERAPY  Noted: 2025  Today's Date: 2025  Patient seen for 7 sessions             Subjective Still having pain in the left side of his neck and a little into his left UT.    Objective   See Exercise, Manual, and Modality Logs for complete treatment.       Assessment/Plan  Restriction and muscle guarding noted in left c-spine and UT.    Progress per Plan of Care           Timed:         Manual Therapy:    25     mins  92494;         Timed Treatment:   25   mins   Total Treatment:     25   mins        Roosevelt Fernández PTA  Physical Therapist Assistant

## 2025-05-29 ENCOUNTER — TREATMENT (OUTPATIENT)
Dept: PHYSICAL THERAPY | Facility: CLINIC | Age: 70
End: 2025-05-29
Payer: COMMERCIAL

## 2025-05-29 DIAGNOSIS — M25.512 ACUTE PAIN OF LEFT SHOULDER: Primary | ICD-10-CM

## 2025-05-29 DIAGNOSIS — M54.2 NECK PAIN: ICD-10-CM

## 2025-05-29 NOTE — PROGRESS NOTES
Physical Therapy Daily Progress Note      Patient: Lauren Yeung   : 1955  Diagnosis/ICD-10 Code:  Acute pain of left shoulder [M25.512]  Referring practitioner: Mundo Zarate MD  Date of Initial Visit: Type: THERAPY  Noted: 2025  Today's Date: 2025  Patient seen for 8 sessions             Subjective No significant change in status since last visit.  Overall, he is able to turn his head better but still stiff looking left.    Objective   See Exercise, Manual, and Modality Logs for complete treatment.       Assessment/Plan  Responded well to manual techniques, having improved left cervical rotation.    Progress per Plan of Care           Timed:         Manual Therapy:    25     mins  46531;     Neuromuscular Jyoti:    8    mins  89690;        Timed Treatment:   33   mins   Total Treatment:     33   mins        Roosevelt Fernández PTA  Physical Therapist Assistant

## 2025-07-14 RX ORDER — ATORVASTATIN CALCIUM 10 MG/1
10 TABLET, FILM COATED ORAL DAILY
Qty: 90 TABLET | Refills: 0 | Status: SHIPPED | OUTPATIENT
Start: 2025-07-14